# Patient Record
Sex: MALE | Race: WHITE | Employment: FULL TIME | URBAN - METROPOLITAN AREA
[De-identification: names, ages, dates, MRNs, and addresses within clinical notes are randomized per-mention and may not be internally consistent; named-entity substitution may affect disease eponyms.]

---

## 2017-09-25 ENCOUNTER — HOSPITAL ENCOUNTER (OUTPATIENT)
Dept: CT IMAGING | Age: 47
Discharge: HOME | End: 2017-09-25
Attending: FAMILY MEDICINE
Payer: COMMERCIAL

## 2017-09-25 DIAGNOSIS — R10.9 FLANK PAIN: ICD-10-CM

## 2017-09-25 PROBLEM — I77.810 DILATED AORTIC ROOT (HCC): Status: ACTIVE | Noted: 2017-09-25

## 2017-09-25 PROBLEM — Z87.442 HISTORY OF KIDNEY STONES: Status: ACTIVE | Noted: 2017-09-25

## 2017-09-25 PROBLEM — I10 HYPERTENSION: Status: ACTIVE | Noted: 2017-09-25

## 2017-09-25 PROBLEM — N20.0 KIDNEY STONES: Status: ACTIVE | Noted: 2017-09-25

## 2017-09-25 PROCEDURE — 74176 CT ABD & PELVIS W/O CONTRAST: CPT

## 2017-11-03 ENCOUNTER — HOSPITAL ENCOUNTER (EMERGENCY)
Age: 47
Discharge: HOME | End: 2017-11-03
Attending: EMERGENCY MEDICINE
Payer: COMMERCIAL

## 2017-11-03 VITALS
OXYGEN SATURATION: 98 % | SYSTOLIC BLOOD PRESSURE: 136 MMHG | HEIGHT: 69 IN | WEIGHT: 211 LBS | HEART RATE: 112 BPM | DIASTOLIC BLOOD PRESSURE: 94 MMHG | BODY MASS INDEX: 31.25 KG/M2 | RESPIRATION RATE: 18 BRPM | TEMPERATURE: 98.5 F

## 2017-11-03 DIAGNOSIS — F11.93 WITHDRAWAL FROM OPIOIDS (HCC): Primary | ICD-10-CM

## 2017-11-03 PROCEDURE — 99283 EMERGENCY DEPT VISIT LOW MDM: CPT | Performed by: EMERGENCY MEDICINE

## 2017-11-03 PROCEDURE — 74011250637 HC RX REV CODE- 250/637: Performed by: EMERGENCY MEDICINE

## 2017-11-03 RX ORDER — PROMETHAZINE HYDROCHLORIDE 25 MG/1
25 TABLET ORAL
Qty: 12 TAB | Refills: 0 | Status: SHIPPED | OUTPATIENT
Start: 2017-11-03 | End: 2019-04-25

## 2017-11-03 RX ORDER — DIAZEPAM 5 MG/1
5 TABLET ORAL
Status: COMPLETED | OUTPATIENT
Start: 2017-11-03 | End: 2017-11-03

## 2017-11-03 RX ORDER — HYOSCYAMINE SULFATE 0.12 MG/1
0.12 TABLET SUBLINGUAL
Qty: 15 TAB | Refills: 0 | Status: SHIPPED | OUTPATIENT
Start: 2017-11-03 | End: 2018-03-19

## 2017-11-03 RX ADMIN — DIAZEPAM 5 MG: 5 TABLET ORAL at 14:07

## 2017-11-03 NOTE — ED PROVIDER NOTES
HPI Comments: 47-year-old gentleman who presents with concerns about withdrawing from his Suboxone type medicine. Patient says that he had been trying to now to help the weaning process he started to get diarrhea, nausea, and jittery. He saw his primary care doctor for these symptoms she prescribed a variety of medicines but the patient says they have not been helping much. Patient says prior to being on the Suboxone he had been on pain medicines through a pain management program.  He says he wanted to get rid of all of those medicines which is why he made the transition. He is now trying to come off of all of these types of medicines and is trying to control his symptoms well enough that he is able to work. Elements of this note were created using speech recognition software. As such, errors of speech recognition may be present. Patient is a 50 y.o. male presenting with other event. The history is provided by the patient. Other   Pertinent negatives include no chest pain, no abdominal pain, no headaches and no shortness of breath. Past Medical History:   Diagnosis Date    Chronic kidney disease     mult kidney stones and states problem with left ureter    Hypertension     Sleep disorder     sleep apnea- uses c-pap       Past Surgical History:   Procedure Laterality Date    HX HEENT      10 facial surgeries    HX ORTHOPAEDIC      left shoulder x 2 with pins in right shoulder    HX ORTHOPAEDIC Right 5/14/15    Big toe fusion    HX UROLOGICAL      right stent and removal         History reviewed. No pertinent family history. Social History     Social History    Marital status: SINGLE     Spouse name: N/A    Number of children: N/A    Years of education: N/A     Occupational History    Not on file.      Social History Main Topics    Smoking status: Never Smoker    Smokeless tobacco: Never Used    Alcohol use No    Drug use: No    Sexual activity: Not on file     Other Topics Concern    Not on file     Social History Narrative         ALLERGIES: Lisinopril    Review of Systems   Constitutional: Positive for appetite change. Negative for chills, diaphoresis and fever. HENT: Negative for congestion, rhinorrhea and sore throat. Eyes: Negative for redness and visual disturbance. Respiratory: Negative for cough, chest tightness, shortness of breath and wheezing. Cardiovascular: Negative for chest pain and palpitations. Gastrointestinal: Positive for diarrhea and nausea. Negative for abdominal pain, blood in stool and vomiting. Endocrine: Negative for polydipsia and polyuria. Genitourinary: Negative for dysuria and hematuria. Musculoskeletal: Negative for arthralgias, myalgias and neck stiffness. Skin: Negative for rash. Allergic/Immunologic: Negative for environmental allergies and food allergies. Neurological: Negative for dizziness, weakness and headaches. Hematological: Negative for adenopathy. Does not bruise/bleed easily. Psychiatric/Behavioral: Negative for confusion and sleep disturbance. The patient is nervous/anxious. Vitals:    11/03/17 1317   BP: (!) 136/94   Pulse: (!) 112   Resp: 18   Temp: 98.5 °F (36.9 °C)   SpO2: 98%   Weight: 95.7 kg (211 lb)   Height: 5' 9\" (1.753 m)            Physical Exam   Constitutional: He is oriented to person, place, and time. He appears well-developed and well-nourished. HENT:   Head: Normocephalic and atraumatic. Eyes: Conjunctivae and EOM are normal. Pupils are equal, round, and reactive to light. Neck: Normal range of motion. Cardiovascular: Regular rhythm. tachycardia   Pulmonary/Chest: Effort normal and breath sounds normal. No respiratory distress. He has no wheezes. He has no rales. He exhibits no tenderness. Abdominal: Soft. Bowel sounds are normal. There is no rebound and no guarding. Musculoskeletal: Normal range of motion. He exhibits no edema or tenderness.    Lymphadenopathy:     He has no cervical adenopathy. Neurological: He is alert and oriented to person, place, and time. Skin: Skin is warm and dry. Psychiatric: He has a normal mood and affect. Nursing note and vitals reviewed. MDM  Number of Diagnoses or Management Options  Withdrawal from opioids [facility]):   Diagnosis management comments: Given a dose of Valium here in the ER and I will substitute some of his medicines for Phenergan and Levsin in the hopes that might give him a better response.     ED Course       Procedures

## 2017-11-03 NOTE — ED NOTES
I have reviewed discharge instructions with the patient. The patient verbalized understanding. Patient left ED via Discharge Method: ambulatory to Home with (insert name of family/friend, self, transport via friend). Opportunity for questions and clarification provided. Patient given 2 scripts.

## 2017-11-03 NOTE — ED TRIAGE NOTES
Pt reports he has been withdrawing from Suboxone for a week now and has developed generalized pain, increased anxiety, shaking, \"feeling hot\" but afebrile among other symptoms.

## 2017-11-03 NOTE — DISCHARGE INSTRUCTIONS
Return with any fevers, bloody vomit, blood in her bowels, worsening symptoms, or additional concerns. Follow-up with your primary care doctor for reevaluation as needed.

## 2018-03-19 ENCOUNTER — HOSPITAL ENCOUNTER (OUTPATIENT)
Dept: CT IMAGING | Age: 48
Discharge: HOME | End: 2018-03-19
Attending: FAMILY MEDICINE
Payer: COMMERCIAL

## 2018-03-19 DIAGNOSIS — R10.9 FLANK PAIN: ICD-10-CM

## 2018-03-19 PROCEDURE — 74176 CT ABD & PELVIS W/O CONTRAST: CPT
